# Patient Record
Sex: FEMALE | Race: AMERICAN INDIAN OR ALASKA NATIVE
[De-identification: names, ages, dates, MRNs, and addresses within clinical notes are randomized per-mention and may not be internally consistent; named-entity substitution may affect disease eponyms.]

---

## 2017-06-19 ENCOUNTER — HOSPITAL ENCOUNTER (EMERGENCY)
Dept: HOSPITAL 43 - DL.ED | Age: 22
Discharge: HOME | End: 2017-06-19
Payer: MEDICAID

## 2017-06-19 VITALS — DIASTOLIC BLOOD PRESSURE: 89 MMHG | SYSTOLIC BLOOD PRESSURE: 130 MMHG

## 2017-06-19 DIAGNOSIS — H54.7: ICD-10-CM

## 2017-06-19 DIAGNOSIS — M43.6: Primary | ICD-10-CM

## 2017-06-19 DIAGNOSIS — J45.909: ICD-10-CM

## 2017-06-19 PROCEDURE — 99283 EMERGENCY DEPT VISIT LOW MDM: CPT

## 2017-06-19 NOTE — EDM.PDOC
ED HPI GENERAL MEDICAL PROBLEM





- General


Chief Complaint: Neck Problem


Stated Complaint: [id number] CANT MOVE NECK


Time Seen by Provider: 06/19/17 13:03


Source of Information: Reports: Patient


History Limitations: Reports: No Limitations





- History of Present Illness


INITIAL COMMENTS - FREE TEXT/NARRATIVE: 





States that she woke this am with neck stiffness and pain. c/o inability to 

rotate head leftward. Denies cervical tenderness or pain. 


Onset: Today


Onset Time: 06:00


Location: Reports: Neck


Quality: Reports: Sharp


Severity: Moderate


Improves with: Reports: Immobilization


Worsens with: Reports: Movement


Context: Reports: Activity


Associated Symptoms: Reports: No Other Symptoms


  ** Left Neck


Pain Score (Numeric/FACES): 8





- Related Data


 Allergies











Allergy/AdvReac Type Severity Reaction Status Date / Time


 


No Known Allergies Allergy   Verified 06/19/17 12:52











Home Meds: 


 Home Meds





. [No Known Home Meds]  04/20/15 [History]











Past Medical History





- Past Health History


Medical/Surgical History: Denies Medical/Surgical History


HEENT History: Reports: Impaired Vision


Respiratory History: Reports: Asthma


Gastrointestinal History: Reports: Helicobacter Pylori


Musculoskeletal History: Reports: Fracture


Psychiatric History: Reports: Depression





- Past Surgical History


Respiratory Surgical History: Reports: None


Musculoskeletal Surgical History: Reports: None





Social & Family History





- Family History


Family Medical History: Noncontributory





- Tobacco Use


Smoking Status *Q: Never Smoker


Second Hand Smoke Exposure: No





- Caffeine Use


Caffeine Use: Reports: Coffee, Energy Drinks, Soda, Tea





- Alcohol Use


Days Per Week of Alcohol Use: 0





- Recreational Drug Use


Recreational Drug Use: No





ED ROS GENERAL





- Review of Systems


Review Of Systems: See Below


Musculoskeletal: Reports: Neck Pain, Muscle Stiffness





ED EXAM, UPPER BACK/NECK PAIN





- Physical Exam


Exam: See Below


Exam Limited By: No Limitations


General Appearance: Alert, WD/WN, No Apparent Distress


Head Exam: Atraumatic, Normocephalic


Neck Exam: Normal Alignment, Normal Inspection, Painful Range of Motion, Stiff 

Neck, Tender Lateral


Cardiovascular/Respiratory: Regular Rate, Rhythm, No M/R/G, Normal Peripheral 

Pulses, No JVD, Normal Breath Sounds, No Respiratory Distress


GI/Abdominal: Normal Bowel Sounds, Soft, Non-Tender, No Organomegaly, No 

Distention, No Abnormal Bruit, No Mass





Course





- Vital Signs


Last Recorded V/S: 


 Last Vital Signs











Temp  97.5 F   06/19/17 12:53


 


Pulse  95   06/19/17 12:53


 


Resp  18   06/19/17 12:53


 


BP  130/89   06/19/17 12:53


 


Pulse Ox  100   06/19/17 12:53














- Orders/Labs/Meds


Meds: 


Medications














Discontinued Medications














Generic Name Dose Route Start Last Admin





  Trade Name Lyly  PRN Reason Stop Dose Admin


 


Cyclobenzaprine HCl  10 mg  06/19/17 13:10  06/19/17 13:17





  Flexeril  PO  06/19/17 13:11  10 mg





  ONETIME ONE   Administration


 


Ibuprofen  800 mg  06/19/17 13:10  06/19/17 13:16





  Motrin  PO  06/19/17 13:11  800 mg





  ONETIME ONE   Administration














- Re-Assessments/Exams


Free Text/Narrative Re-Assessment/Exam: 





06/19/17 13:41


Pt is able to relax shoulder now. states that she feels a little better





Departure





- Departure


Time of Disposition: 13:41


Disposition: Home, Self-Care 01


Condition: Good


Clinical Impression: 


 Stiffness of neck








- Discharge Information


Instructions:  Muscle Pain, Adult


Forms:  ED Department Discharge


Additional Instructions: 


You may put heat on the area to help loosen the stiffness. take medication as 

prescribed. Follow up with your PCP in 1 week in not better. Return for 

worsening symptoms

## 2018-09-04 ENCOUNTER — HOSPITAL ENCOUNTER (EMERGENCY)
Dept: HOSPITAL 43 - DL.ED | Age: 23
LOS: 1 days | Discharge: HOME | End: 2018-09-05
Payer: MEDICAID

## 2018-09-04 VITALS — SYSTOLIC BLOOD PRESSURE: 140 MMHG | DIASTOLIC BLOOD PRESSURE: 110 MMHG

## 2018-09-04 DIAGNOSIS — K27.9: Primary | ICD-10-CM

## 2018-09-04 LAB
ANION GAP SERPL CALC-SCNC: 11.1 MMOL/L
CHLORIDE SERPL-SCNC: 104 MMOL/L (ref 101–111)
SODIUM SERPL-SCNC: 138 MMOL/L (ref 135–145)

## 2018-09-04 PROCEDURE — 74177 CT ABD & PELVIS W/CONTRAST: CPT

## 2018-09-04 PROCEDURE — 96376 TX/PRO/DX INJ SAME DRUG ADON: CPT

## 2018-09-04 PROCEDURE — 85025 COMPLETE CBC W/AUTO DIFF WBC: CPT

## 2018-09-04 PROCEDURE — 96375 TX/PRO/DX INJ NEW DRUG ADDON: CPT

## 2018-09-04 PROCEDURE — 36415 COLL VENOUS BLD VENIPUNCTURE: CPT

## 2018-09-04 PROCEDURE — 96374 THER/PROPH/DIAG INJ IV PUSH: CPT

## 2018-09-04 PROCEDURE — 80053 COMPREHEN METABOLIC PANEL: CPT

## 2018-09-04 PROCEDURE — 81025 URINE PREGNANCY TEST: CPT

## 2018-09-04 PROCEDURE — C9113 INJ PANTOPRAZOLE SODIUM, VIA: HCPCS

## 2018-09-04 PROCEDURE — 81001 URINALYSIS AUTO W/SCOPE: CPT

## 2018-09-04 PROCEDURE — 96361 HYDRATE IV INFUSION ADD-ON: CPT

## 2018-09-04 PROCEDURE — 83690 ASSAY OF LIPASE: CPT

## 2018-09-04 PROCEDURE — 82150 ASSAY OF AMYLASE: CPT

## 2018-09-04 PROCEDURE — 99284 EMERGENCY DEPT VISIT MOD MDM: CPT

## 2018-09-04 NOTE — EDM.PDOC
ED HPI GENERAL MEDICAL PROBLEM





- General


Chief Complaint: Abdominal Pain


Stated Complaint: STOMACH PAINS [id number]


Time Seen by Provider: 09/04/18 19:40


Source of Information: Reports: Patient


History Limitations: Reports: No Limitations





- History of Present Illness


INITIAL COMMENTS - FREE TEXT/NARRATIVE: 





This 21 yo female patient reports to the ED with increased abdominal pain. The 

patient reports her abdominal pain started yesterday after she ate fried food 

and has continued throughout today. The patient reports her current pain is a 8/

10. The patient reports some relief after she forced herself to vomit. The 

patient states that she tried to eat some food to day, but after several bites 

her abdominal pain got much worse. The patient reports her mother had 

gallbladder problems and her father had pancreatitis. 


Onset Date: 09/03/18


Duration: Constant


Location: Reports: Abdomen (upper abdomen )


Quality: Reports: Ache, Sharp


Severity: Severe


Improves with: Reports: None


Worsens with: Reports: Eating


Context: Reports: Other


Associated Symptoms: Reports: Nausea/Vomiting


  ** Epigastric


Pain Score (Numeric/FACES): 8





- Related Data


 Allergies











Allergy/AdvReac Type Severity Reaction Status Date / Time


 


No Known Allergies Allergy   Verified 09/04/18 19:50











Home Meds: 


 Home Meds





Ethinyl Estradiol/Drospirenone [Gianvi 3 mg-0.02 mg Tablet] 1 tab PO DAILY 09/04 /18 [History]











Past Medical History





- Past Health History


Medical/Surgical History: Denies Medical/Surgical History


HEENT History: Reports: Impaired Vision


Respiratory History: Reports: Asthma


Gastrointestinal History: Reports: Helicobacter Pylori


Musculoskeletal History: Reports: Fracture


Psychiatric History: Reports: Depression





- Past Surgical History


Respiratory Surgical History: Reports: None


Musculoskeletal Surgical History: Reports: None





Social & Family History





- Family History


Family Medical History: Noncontributory





- Caffeine Use


Caffeine Use: Reports: Coffee, Energy Drinks, Soda, Tea





ED ROS GENERAL





- Review of Systems


Review Of Systems: ROS reveals no pertinent complaints other than HPI.





ED EXAM, GI/ABD





- Physical Exam


Exam: See Below


Exam Limited By: No Limitations


General Appearance: Alert, WD/WN, Moderate Distress


Eyes: Bilateral: Normal Appearance, EOMI


Ears: Normal External Exam, Normal Canal, Hearing Grossly Normal, Normal TMs


Nose: Normal Inspection, Normal Mucosa, No Blood


Throat/Mouth: Normal Inspection, Normal Lips, Normal Teeth, Normal Gums, Normal 

Oropharynx, Normal Voice, No Airway Compromise


Head: Atraumatic, Normocephalic


Neck: Normal Inspection, Supple, Non-Tender, Full Range of Motion


Respiratory/Chest: No Respiratory Distress, Lungs Clear, Normal Breath Sounds, 

No Accessory Muscle Use, Chest Non-Tender


Cardiovascular: Normal Peripheral Pulses


GI/Abdominal Exam: Normal Bowel Sounds, Soft, Guarding, Tender (upper abdominal 

pain)


 (Female) Exam: Deferred


Rectal (Female) Exam: Deferred


Back Exam: Normal Inspection, Full Range of Motion, NT


Extremities: Normal Inspection, Normal Range of Motion, Non-Tender, Normal 

Capillary Refill, No Pedal Edema


Neurological: Alert, Oriented, CN II-XII Intact, Normal Cognition, Normal Gait, 

Normal Reflexes, No Motor/Sensory Deficits


Psychiatric: Normal Affect, Normal Mood


Skin Exam: Warm, Dry, Intact, Normal Color, No Rash


Lymphatic: No Adenopathy





Course





- Vital Signs


Last Recorded V/S: 


 Last Vital Signs











Temp  37.3 C   09/04/18 19:38


 


Pulse  110 H  09/04/18 19:38


 


Resp  15   09/04/18 19:38


 


BP  140/110 H  09/04/18 19:38


 


Pulse Ox  100   09/04/18 19:38














- Orders/Labs/Meds


Orders: 


 Active Orders 24 hr











 Category Date Time Status


 


 HCG QUALITATIVE,URINE [URCHEM] Stat Lab  09/04/18 20:45 Ordered


 


 UA W/MICROSCOPIC [URIN] Stat Lab  09/04/18 20:45 Ordered











Labs: 


 Laboratory Tests











  09/04/18 09/04/18 09/04/18 Range/Units





  19:48 19:48 19:48 


 


WBC   12.6 H   (5.0-10.0)  10^3/uL


 


RBC   4.84   (4.2-5.4)  10^6/uL


 


Hgb   12.9   (12.0-16.0)  g/dL


 


Hct   39.3   (37.0-47.0)  %


 


MCV   81.2   ()  fL


 


MCH   26.7 L   (27.0-34.0)  pg


 


MCHC   32.8 L   (33.0-35.0)  g/dL


 


Plt Count   295   (150-450)  10^3/uL


 


Neut % (Auto)   60.5   (42.2-75.2)  %


 


Lymph % (Auto)   30.1   (20.5-50.1)  %


 


Mono % (Auto)   6.4   (2-8)  %


 


Eos % (Auto)   2.4   (1.0-3.0)  %


 


Baso % (Auto)   0.6   (0.0-1.0)  %


 


Sodium    138  (135-145)  mmol/L


 


Potassium    3.1 L  (3.6-5.0)  mmol/L


 


Chloride    104  (101-111)  mmol/L


 


Carbon Dioxide    26.0  (21.0-31.0)  mmol/L


 


Anion Gap    11.1  


 


BUN    9  (7-18)  mg/dL


 


Creatinine    0.7  (0.6-1.3)  mg/dL


 


Est Cr Clr Drug Dosing    136.32  mL/min


 


Estimated GFR (MDRD)    > 60  


 


BUN/Creatinine Ratio    12.85  


 


Glucose    123 H  ()  mg/dL


 


Calcium    9.0  (8.4-10.2)  mg/dl


 


Total Bilirubin    0.5  (0.2-1.0)  mg/dL


 


AST    22  (10-42)  IU/L


 


ALT    21  (10-60)  IU/L


 


Alkaline Phosphatase    55  ()  IU/L


 


Total Protein    7.9  (6.7-8.2)  g/dl


 


Albumin    4.1  (3.2-5.5)  g/dl


 


Globulin    3.8  


 


Albumin/Globulin Ratio    1.08  


 


Amylase  75    ()  U/L


 


Lipase  28    (22-51)  U/L


 


Urine Color     (YELLOW)  


 


Urine Appearance     (CLEAR)  


 


Urine pH     (5.0-9.0)  


 


Ur Specific Gravity     (1.005-1.030)  


 


Urine Protein     (NEGATIVE)  


 


Urine Glucose (UA)     (NEGATIVE)  


 


Urine Ketones     (NEGATIVE)  


 


Urine Occult Blood     (NEGATIVE)  


 


Urine Nitrite     (NEGATIVE)  


 


Urine Bilirubin     (NEGATIVE)  


 


Urine Urobilinogen     (0.2-1.0)  mg/dL


 


Ur Leukocyte Esterase     (NEGATIVE)  


 


Urine RBC     /HPF


 


Urine WBC     (0-5/HPF)  /HPF


 


Ur Epithelial Cells     /HPF


 


Urine Bacteria     (0-FEW/HPF)  /HPF


 


Urinalysis Comment     


 


Urine HCG, Qual     














  09/04/18 09/04/18 Range/Units





  20:45 20:45 


 


WBC    (5.0-10.0)  10^3/uL


 


RBC    (4.2-5.4)  10^6/uL


 


Hgb    (12.0-16.0)  g/dL


 


Hct    (37.0-47.0)  %


 


MCV    ()  fL


 


MCH    (27.0-34.0)  pg


 


MCHC    (33.0-35.0)  g/dL


 


Plt Count    (150-450)  10^3/uL


 


Neut % (Auto)    (42.2-75.2)  %


 


Lymph % (Auto)    (20.5-50.1)  %


 


Mono % (Auto)    (2-8)  %


 


Eos % (Auto)    (1.0-3.0)  %


 


Baso % (Auto)    (0.0-1.0)  %


 


Sodium    (135-145)  mmol/L


 


Potassium    (3.6-5.0)  mmol/L


 


Chloride    (101-111)  mmol/L


 


Carbon Dioxide    (21.0-31.0)  mmol/L


 


Anion Gap    


 


BUN    (7-18)  mg/dL


 


Creatinine    (0.6-1.3)  mg/dL


 


Est Cr Clr Drug Dosing    mL/min


 


Estimated GFR (MDRD)    


 


BUN/Creatinine Ratio    


 


Glucose    ()  mg/dL


 


Calcium    (8.4-10.2)  mg/dl


 


Total Bilirubin    (0.2-1.0)  mg/dL


 


AST    (10-42)  IU/L


 


ALT    (10-60)  IU/L


 


Alkaline Phosphatase    ()  IU/L


 


Total Protein    (6.7-8.2)  g/dl


 


Albumin    (3.2-5.5)  g/dl


 


Globulin    


 


Albumin/Globulin Ratio    


 


Amylase    ()  U/L


 


Lipase    (22-51)  U/L


 


Urine Color  Yellow   (YELLOW)  


 


Urine Appearance  Slightly cloudy   (CLEAR)  


 


Urine pH  7.0   (5.0-9.0)  


 


Ur Specific Gravity  1.015   (1.005-1.030)  


 


Urine Protein  Negative   (NEGATIVE)  


 


Urine Glucose (UA)  Negative   (NEGATIVE)  


 


Urine Ketones  Negative   (NEGATIVE)  


 


Urine Occult Blood  Negative   (NEGATIVE)  


 


Urine Nitrite  Negative   (NEGATIVE)  


 


Urine Bilirubin  Negative   (NEGATIVE)  


 


Urine Urobilinogen  0.2   (0.2-1.0)  mg/dL


 


Ur Leukocyte Esterase  Trace H   (NEGATIVE)  


 


Urine RBC  0-5   /HPF


 


Urine WBC  0-5   (0-5/HPF)  /HPF


 


Ur Epithelial Cells  Moderate H   /HPF


 


Urine Bacteria  Moderate H   (0-FEW/HPF)  /HPF


 


Urinalysis Comment     


 


Urine HCG, Qual   Negative  











Meds: 


Medications














Discontinued Medications














Generic Name Dose Route Start Last Admin





  Trade Name Lyly  PRN Reason Stop Dose Admin


 


Sodium Chloride  1,000 mls @ 999 mls/hr  09/04/18 20:09  09/04/18 20:17





  Normal Saline  IV  09/04/18 21:09  999 mls/hr





  .BOLUS ONE   Administration





     





     





     





     


 


Iopamidol  75 ml  09/04/18 20:13  09/04/18 22:26





  Isovue-300 (61%)  IVPUSH  09/04/18 20:14  100 ml





  ONETIME ONE   Administration





     





     





     





     


 


Morphine Sulfate  2 mg  09/04/18 19:56  09/04/18 20:04





  Morphine  IVPUSH  09/04/18 19:57  2 mg





  ONETIME ONE   Administration





     





     





     





     


 


Morphine Sulfate  2 mg  09/04/18 21:19  09/04/18 21:29





  Morphine  IVPUSH  09/04/18 21:20  2 mg





  ONETIME ONE   Administration





     





     





     





     


 


Ondansetron HCl  4 mg  09/04/18 19:56  09/04/18 20:02





  Zofran  IV  09/04/18 19:57  4 mg





  ONETIME ONE   Administration





     





     





     





     














- Re-Assessments/Exams


Free Text/Narrative Re-Assessment/Exam: 





09/04/18 21:20


The patient reports her current pain is a 5/10 (after getting IV Morphine). The 

patient was advised of the lab results and of the order for a CT of her abdomen 

and pelvis. 





Departure





- Departure


Time of Disposition: 00:03


Disposition: Home, Self-Care 01


Condition: Fair


Clinical Impression: 


 PUD (peptic ulcer disease)








- Discharge Information


*PRESCRIPTION DRUG MONITORING PROGRAM REVIEWED*: Not Applicable


*COPY OF PRESCRIPTION DRUG MONITORING REPORT IN PATIENT RANDALL: Not Applicable


Instructions:  Peptic Ulcer, Easy-to-Read


Forms:  ED Department Discharge


Care Plan Goals: 


The patient was advised of the examination, lab and CT results during the 

visit. The patient was given an IV dose of Protonix and IV pain medications 

while in the ED. The patient was discharged with a script for Omeprazole (20 mg

) #30 to take 1 by mouth daily 30 minutes prior to eating. If the patient has 

any additional symptoms or concerns, the patient should follow-up with her 

primary care facility or return to the emergency department. 





- My Orders


Last 24 Hours: 


My Active Orders





09/04/18 20:45


HCG QUALITATIVE,URINE [URCHEM] Stat 


UA W/MICROSCOPIC [URIN] Stat 














- Assessment/Plan


Last 24 Hours: 


My Active Orders





09/04/18 20:45


HCG QUALITATIVE,URINE [URCHEM] Stat 


UA W/MICROSCOPIC [URIN] Stat

## 2019-09-14 NOTE — EDM.PDOC
ED HPI GENERAL MEDICAL PROBLEM





- General


Chief Complaint: Abdominal Pain


Stated Complaint: BAD STOMACH PAIN


Time Seen by Provider: 09/14/19 23:14


Source of Information: Reports: Patient


History Limitations: Reports: No Limitations





- History of Present Illness


INITIAL COMMENTS - FREE TEXT/NARRATIVE: 





onset recurrent epiG pain since eating pizza 2 hours ago. nausea. no V/D


Treatments PTA: Reports: Other Medication(s)


  ** Anterior Epigastric


Pain Score (Numeric/FACES): 8





- Related Data


 Allergies











Allergy/AdvReac Type Severity Reaction Status Date / Time


 


No Known Allergies Allergy   Verified 09/04/18 19:50











Home Meds: 


 Home Meds





Ethinyl Estradiol/Drospirenone [Gianvi 3 mg-0.02 mg Tablet] 1 tab PO DAILY 09/04 /18 [History]











Past Medical History





- Past Health History


Medical/Surgical History: Denies Medical/Surgical History


HEENT History: Reports: Impaired Vision


Cardiovascular History: Reports: Hypertension


Respiratory History: Reports: Asthma


Gastrointestinal History: Reports: GERD, Helicobacter Pylori, PUD


Musculoskeletal History: Reports: Fracture


Psychiatric History: Reports: Anxiety, Depression





- Past Surgical History


Respiratory Surgical History: Reports: None


Musculoskeletal Surgical History: Reports: None





Social & Family History





- Family History


Family Medical History: Noncontributory





- Tobacco Use


Smoking Status *Q: Never Smoker


Second Hand Smoke Exposure: No





- Caffeine Use


Caffeine Use: Reports: Coffee, Soda





- Recreational Drug Use


Recreational Drug Use: No





ED ROS GENERAL





- Review of Systems


Review Of Systems: ROS reveals no pertinent complaints other than HPI.





ED EXAM, GI/ABD





- Physical Exam


Exam: See Below


Exam Limited By: No Limitations


General Appearance: Alert, WD/WN, Mild Distress, Other (tearful)


Ears: Hearing Grossly Normal


Throat/Mouth: Normal Voice, No Airway Compromise


Head: Atraumatic


Neck: Non-Tender, Full Range of Motion


Respiratory/Chest: No Respiratory Distress


Cardiovascular: Regular Rate, Rhythm


GI/Abdominal Exam: Tender.  No: Distended, Guarding, Rigid, Rebound


Neurological: Alert, Oriented, Normal Cognition, Normal Gait, No Motor/Sensory 

Deficits


Psychiatric: Tearful


Skin Exam: Warm, Dry, Normal Color


Lymphatic: No Adenopathy





Course





- Vital Signs


Last Recorded V/S: 


 Last Vital Signs











Temp  36.1 C   09/14/19 23:06


 


Pulse  110 H  09/14/19 23:06


 


Resp  17   09/14/19 23:06


 


BP  124/84   09/14/19 23:06


 


Pulse Ox  100   09/14/19 23:06














- Orders/Labs/Meds


Labs: 


 Laboratory Tests











  09/14/19 09/14/19 09/14/19 Range/Units





  00:07 00:07 00:07 


 


WBC     (5.0-10.0)  10^3/uL


 


RBC     (4.2-5.4)  10^6/uL


 


Hgb     (12.0-16.0)  g/dL


 


Hct     (37.0-47.0)  %


 


MCV     ()  fL


 


MCH     (27.0-34.0)  pg


 


MCHC     (33.0-35.0)  g/dL


 


Plt Count     (150-450)  10^3/uL


 


Neut % (Auto)     (42.2-75.2)  %


 


Lymph % (Auto)     (20.5-50.1)  %


 


Mono % (Auto)     (2-8)  %


 


Eos % (Auto)     (1.0-3.0)  %


 


Baso % (Auto)     (0.0-1.0)  %


 


Sodium     (135-145)  mmol/L


 


Potassium     (3.6-5.0)  mmol/L


 


Chloride     (101-111)  mmol/L


 


Carbon Dioxide     (21.0-31.0)  mmol/L


 


Anion Gap     


 


BUN     (7-18)  mg/dL


 


Creatinine     (0.6-1.3)  mg/dL


 


Est Cr Clr Drug Dosing     mL/min


 


Estimated GFR (MDRD)     


 


BUN/Creatinine Ratio     


 


Glucose     ()  mg/dL


 


Calcium     (8.4-10.2)  mg/dl


 


Total Bilirubin     (0.2-1.0)  mg/dL


 


AST     (10-42)  IU/L


 


ALT     (10-60)  IU/L


 


Alkaline Phosphatase     ()  IU/L


 


Total Protein     (6.7-8.2)  g/dl


 


Albumin     (3.2-5.5)  g/dl


 


Globulin     


 


Albumin/Globulin Ratio     


 


Amylase     ()  U/L


 


Lipase     (22-51)  U/L


 


Urine Color  Yellow    (YELLOW)  


 


Urine Appearance  Clear    (CLEAR)  


 


Urine pH  7.0    (5.0-9.0)  


 


Ur Specific Gravity  1.010    (1.005-1.030)  


 


Urine Protein  Negative    (NEGATIVE)  


 


Urine Glucose (UA)  Negative    (NEGATIVE)  


 


Urine Ketones  Negative    (NEGATIVE)  


 


Urine Occult Blood  Trace-intact H    (NEGATIVE)  


 


Urine Nitrite  Negative    (NEGATIVE)  


 


Urine Bilirubin  Negative    (NEGATIVE)  


 


Urine Urobilinogen  0.2    (0.2-1.0)  mg/dL


 


Ur Leukocyte Esterase  Negative    (NEGATIVE)  


 


Urine RBC  0-5    /HPF


 


Urine WBC  Not seen    (0-5/HPF)  /HPF


 


Ur Epithelial Cells  Few    (NOT SEEN)  /HPF


 


Urine Bacteria  Few    (0-FEW/HPF)  /HPF


 


Urine HCG, Qual   Negative   


 


Urine Opiates Screen    Negative  (NEGATIVE)  


 


Ur Oxycodone Screen    Negative  (NEGATIVE)  


 


Urine Methadone Screen    Negative  (NEGATIVE)  


 


Ur Barbiturates Screen    Negative  (NEGATIVE)  


 


U Tricyclic Antidepress    Negative  (NEGATIVE)  


 


Ur Phencyclidine Scrn    Negative  (NEGATIVE)  


 


Ur Amphetamine Screen    Negative  (NEGATIVE)  


 


U Methamphetamines Scrn    Negative  (NEGATIVE)  


 


Urine MDMA Screen    Negative  (NEGATIVE)  


 


U Benzodiazepines Scrn    Negative  (NEGATIVE)  


 


Urine Cocaine Screen    Negative  (NEGATIVE)  


 


U Marijuana (THC) Screen    Negative  (NEGATIVE)  














  09/14/19 09/14/19 Range/Units





  23:20 23:20 


 


WBC  12.6 H   (5.0-10.0)  10^3/uL


 


RBC  5.09   (4.2-5.4)  10^6/uL


 


Hgb  13.4   (12.0-16.0)  g/dL


 


Hct  41.2   (37.0-47.0)  %


 


MCV  80.9   ()  fL


 


MCH  26.3 L   (27.0-34.0)  pg


 


MCHC  32.5 L   (33.0-35.0)  g/dL


 


Plt Count  306   (150-450)  10^3/uL


 


Neut % (Auto)  58.8   (42.2-75.2)  %


 


Lymph % (Auto)  32.0   (20.5-50.1)  %


 


Mono % (Auto)  7.0   (2-8)  %


 


Eos % (Auto)  1.7   (1.0-3.0)  %


 


Baso % (Auto)  0.5   (0.0-1.0)  %


 


Sodium   139  (135-145)  mmol/L


 


Potassium   3.7  (3.6-5.0)  mmol/L


 


Chloride   102  (101-111)  mmol/L


 


Carbon Dioxide   28.0  (21.0-31.0)  mmol/L


 


Anion Gap   12.7  


 


BUN   14  (7-18)  mg/dL


 


Creatinine   0.8  (0.6-1.3)  mg/dL


 


Est Cr Clr Drug Dosing   122.24  mL/min


 


Estimated GFR (MDRD)   > 60  


 


BUN/Creatinine Ratio   17.50  


 


Glucose   105  ()  mg/dL


 


Calcium   8.9  (8.4-10.2)  mg/dl


 


Total Bilirubin   0.2  (0.2-1.0)  mg/dL


 


AST   23  (10-42)  IU/L


 


ALT   28  (10-60)  IU/L


 


Alkaline Phosphatase   49  ()  IU/L


 


Total Protein   7.6  (6.7-8.2)  g/dl


 


Albumin   3.9  (3.2-5.5)  g/dl


 


Globulin   3.7  


 


Albumin/Globulin Ratio   1.05  


 


Amylase   89  ()  U/L


 


Lipase   33  (22-51)  U/L


 


Urine Color    (YELLOW)  


 


Urine Appearance    (CLEAR)  


 


Urine pH    (5.0-9.0)  


 


Ur Specific Gravity    (1.005-1.030)  


 


Urine Protein    (NEGATIVE)  


 


Urine Glucose (UA)    (NEGATIVE)  


 


Urine Ketones    (NEGATIVE)  


 


Urine Occult Blood    (NEGATIVE)  


 


Urine Nitrite    (NEGATIVE)  


 


Urine Bilirubin    (NEGATIVE)  


 


Urine Urobilinogen    (0.2-1.0)  mg/dL


 


Ur Leukocyte Esterase    (NEGATIVE)  


 


Urine RBC    /HPF


 


Urine WBC    (0-5/HPF)  /HPF


 


Ur Epithelial Cells    (NOT SEEN)  /HPF


 


Urine Bacteria    (0-FEW/HPF)  /HPF


 


Urine HCG, Qual    


 


Urine Opiates Screen    (NEGATIVE)  


 


Ur Oxycodone Screen    (NEGATIVE)  


 


Urine Methadone Screen    (NEGATIVE)  


 


Ur Barbiturates Screen    (NEGATIVE)  


 


U Tricyclic Antidepress    (NEGATIVE)  


 


Ur Phencyclidine Scrn    (NEGATIVE)  


 


Ur Amphetamine Screen    (NEGATIVE)  


 


U Methamphetamines Scrn    (NEGATIVE)  


 


Urine MDMA Screen    (NEGATIVE)  


 


U Benzodiazepines Scrn    (NEGATIVE)  


 


Urine Cocaine Screen    (NEGATIVE)  


 


U Marijuana (THC) Screen    (NEGATIVE)  











Meds: 


Medications














Discontinued Medications














Generic Name Dose Route Start Last Admin





  Trade Name Freq  PRN Reason Stop Dose Admin


 


Sodium Chloride  1,000 mls @ 999 mls/hr  09/14/19 23:12  09/14/19 23:27





  Normal Saline  IV  09/15/19 00:12  999 mls/hr





  .BOLUS ONE   Administration





     





     





     





     


 


Morphine Sulfate  2 mg  09/15/19 01:33  





  Morphine  IVPUSH  09/15/19 01:34  





  ONETIME ONE   





     





     





     





     


 


Ondansetron HCl  4 mg  09/14/19 23:12  09/14/19 23:27





  Zofran  IV  09/14/19 23:13  4 mg





  ONETIME ONE   Administration





     





     





     





     


 


Pantoprazole Sodium  80 mg  09/15/19 00:26  09/15/19 00:51





  Protonix Iv***  IVPUSH  09/15/19 00:27  80 mg





  .BOLUS ONE   Administration





     





     





     





     














- Re-Assessments/Exams


Free Text/Narrative Re-Assessment/Exam: 





09/15/19 01:34


results discussed with pt





Departure





- Departure


Time of Disposition: 01:36


Disposition: Home, Self-Care 01


Condition: Good


Clinical Impression: 


 PUD (peptic ulcer disease)





GERD (gastroesophageal reflux disease)


Qualifiers:


 Esophagitis presence: with esophagitis Qualified Code(s): K21.0 - Gastro-

esophageal reflux disease with esophagitis








- Discharge Information


Forms:  ED Department Discharge


Additional Instructions: 


1) avoid spicy and fried foods


2) see clinic for GALL BLADDER ULTRASOUND